# Patient Record
Sex: MALE | HISPANIC OR LATINO | Employment: FULL TIME | URBAN - METROPOLITAN AREA
[De-identification: names, ages, dates, MRNs, and addresses within clinical notes are randomized per-mention and may not be internally consistent; named-entity substitution may affect disease eponyms.]

---

## 2017-10-23 LAB — HCV AB SER-ACNC: NEGATIVE

## 2021-04-22 ENCOUNTER — OFFICE VISIT (OUTPATIENT)
Dept: FAMILY MEDICINE CLINIC | Facility: CLINIC | Age: 31
End: 2021-04-22
Payer: COMMERCIAL

## 2021-04-22 VITALS
BODY MASS INDEX: 25.4 KG/M2 | DIASTOLIC BLOOD PRESSURE: 78 MMHG | TEMPERATURE: 97.2 F | OXYGEN SATURATION: 99 % | HEART RATE: 99 BPM | HEIGHT: 71 IN | RESPIRATION RATE: 16 BRPM | SYSTOLIC BLOOD PRESSURE: 118 MMHG | WEIGHT: 181.4 LBS

## 2021-04-22 DIAGNOSIS — M79.5 FOREIGN BODY (FB) IN SOFT TISSUE: Primary | ICD-10-CM

## 2021-04-22 DIAGNOSIS — Z78.9 BODY PIERCING: ICD-10-CM

## 2021-04-22 PROCEDURE — 99213 OFFICE O/P EST LOW 20 MIN: CPT | Performed by: FAMILY MEDICINE

## 2021-04-22 PROCEDURE — 1036F TOBACCO NON-USER: CPT | Performed by: FAMILY MEDICINE

## 2021-04-22 PROCEDURE — 3725F SCREEN DEPRESSION PERFORMED: CPT | Performed by: FAMILY MEDICINE

## 2021-04-22 PROCEDURE — 3008F BODY MASS INDEX DOCD: CPT | Performed by: FAMILY MEDICINE

## 2021-04-22 NOTE — PROGRESS NOTES
Assessment/Plan:  Patient is 24-year-old male new to our office  He has stab wishing care and would like to discuss removal of a dermal piercing  Piercing is located under the skin on the midshaft of the anterior penis  There is no sign of infection or scar tissue  I told him that this would need to be surgically removed  I will refer him to plastic surgery  We will have him sign a records release  He says that he had blood work and immunizations while in [facility]  Diagnoses and all orders for this visit:    Foreign body (FB) in soft tissue  -     Ambulatory referral to Plastic Surgery; Future    Body piercing          Subjective:   Chief Complaint   Patient presents with   Jennie Lowe Establish Care     NP    Procedure     Piercing Removal Genital      Patient ID: Day Urias is a 36 y o  male  Harrison Alfaroer is a new patient to us  He was incarcerated for 8 years  Working 3rd shift  Works in a warehouse  Tries to work out, but difficult with 3rd shift work  Has a healthy diet  Lives with his girlfriend and her children  Has an 6year old daughter  Has piercing in genital area to be removed  The following portions of the patient's history were reviewed and updated as appropriate: allergies, current medications, past family history, past medical history, past social history, past surgical history and problem list     I reviewed answers  That patient filled out on line regarding personal and medical history  Review of Systems   Constitutional: Negative for chills and fever  HENT: Negative for congestion and sore throat  Respiratory: Negative for chest tightness  Cardiovascular: Negative for chest pain and palpitations  Gastrointestinal: Negative for abdominal pain, constipation, diarrhea and nausea  Genitourinary: Negative for difficulty urinating  Skin: Negative  Neurological: Negative for dizziness and headaches  Psychiatric/Behavioral: Positive for sleep disturbance  Objective:      /78 (BP Location: Left arm, Patient Position: Sitting, Cuff Size: Standard)   Pulse 99   Temp (!) 97 2 °F (36 2 °C) (Tympanic)   Resp 16   Ht 5' 11" (1 803 m)   Wt 82 3 kg (181 lb 6 4 oz)   SpO2 99%   BMI 25 30 kg/m²          Physical Exam  Vitals signs and nursing note reviewed  Constitutional:       General: He is not in acute distress  HENT:      Head: Normocephalic  Right Ear: Tympanic membrane normal       Left Ear: Tympanic membrane normal       Mouth/Throat:      Pharynx: No posterior oropharyngeal erythema  Neck:      Thyroid: No thyromegaly  Cardiovascular:      Rate and Rhythm: Normal rate and regular rhythm  Heart sounds: Normal heart sounds  Pulmonary:      Effort: Pulmonary effort is normal       Breath sounds: Normal breath sounds  Genitourinary:     Comments: Dermal piercing, anterior mid shaft of penis  Freely movable under skin  Lymphadenopathy:      Cervical: No cervical adenopathy  Skin:     General: Skin is warm and dry  Neurological:      Mental Status: He is alert and oriented to person, place, and time     Psychiatric:         Mood and Affect: Mood normal

## 2021-06-09 ENCOUNTER — OFFICE VISIT (OUTPATIENT)
Dept: FAMILY MEDICINE CLINIC | Facility: CLINIC | Age: 31
End: 2021-06-09
Payer: COMMERCIAL

## 2021-06-09 ENCOUNTER — APPOINTMENT (OUTPATIENT)
Dept: LAB | Facility: CLINIC | Age: 31
End: 2021-06-09
Payer: COMMERCIAL

## 2021-06-09 VITALS
SYSTOLIC BLOOD PRESSURE: 116 MMHG | DIASTOLIC BLOOD PRESSURE: 72 MMHG | OXYGEN SATURATION: 98 % | RESPIRATION RATE: 16 BRPM | HEIGHT: 72 IN | WEIGHT: 176.6 LBS | TEMPERATURE: 98 F | BODY MASS INDEX: 23.92 KG/M2 | HEART RATE: 98 BPM

## 2021-06-09 DIAGNOSIS — Z00.00 WELL ADULT EXAM: Primary | ICD-10-CM

## 2021-06-09 DIAGNOSIS — F51.01 PRIMARY INSOMNIA: ICD-10-CM

## 2021-06-09 DIAGNOSIS — R41.840 ATTENTION AND CONCENTRATION DEFICIT: ICD-10-CM

## 2021-06-09 LAB
ALBUMIN SERPL BCP-MCNC: 4.1 G/DL (ref 3.5–5)
ALP SERPL-CCNC: 74 U/L (ref 46–116)
ALT SERPL W P-5'-P-CCNC: 26 U/L (ref 12–78)
ANION GAP SERPL CALCULATED.3IONS-SCNC: 5 MMOL/L (ref 4–13)
AST SERPL W P-5'-P-CCNC: 15 U/L (ref 5–45)
BASOPHILS # BLD AUTO: 0.02 THOUSANDS/ΜL (ref 0–0.1)
BASOPHILS NFR BLD AUTO: 0 % (ref 0–1)
BILIRUB SERPL-MCNC: 0.44 MG/DL (ref 0.2–1)
BUN SERPL-MCNC: 15 MG/DL (ref 5–25)
CALCIUM SERPL-MCNC: 9.4 MG/DL (ref 8.3–10.1)
CHLORIDE SERPL-SCNC: 109 MMOL/L (ref 100–108)
CO2 SERPL-SCNC: 28 MMOL/L (ref 21–32)
CREAT SERPL-MCNC: 1.1 MG/DL (ref 0.6–1.3)
EOSINOPHIL # BLD AUTO: 0.11 THOUSAND/ΜL (ref 0–0.61)
EOSINOPHIL NFR BLD AUTO: 1 % (ref 0–6)
ERYTHROCYTE [DISTWIDTH] IN BLOOD BY AUTOMATED COUNT: 13.6 % (ref 11.6–15.1)
GFR SERPL CREATININE-BSD FRML MDRD: 90 ML/MIN/1.73SQ M
GLUCOSE SERPL-MCNC: 79 MG/DL (ref 65–140)
HCT VFR BLD AUTO: 45.8 % (ref 36.5–49.3)
HGB BLD-MCNC: 16.1 G/DL (ref 12–17)
IMM GRANULOCYTES # BLD AUTO: 0.03 THOUSAND/UL (ref 0–0.2)
IMM GRANULOCYTES NFR BLD AUTO: 0 % (ref 0–2)
LYMPHOCYTES # BLD AUTO: 2.44 THOUSANDS/ΜL (ref 0.6–4.47)
LYMPHOCYTES NFR BLD AUTO: 25 % (ref 14–44)
MCH RBC QN AUTO: 31.9 PG (ref 26.8–34.3)
MCHC RBC AUTO-ENTMCNC: 35.2 G/DL (ref 31.4–37.4)
MCV RBC AUTO: 91 FL (ref 82–98)
MONOCYTES # BLD AUTO: 0.66 THOUSAND/ΜL (ref 0.17–1.22)
MONOCYTES NFR BLD AUTO: 7 % (ref 4–12)
NEUTROPHILS # BLD AUTO: 6.38 THOUSANDS/ΜL (ref 1.85–7.62)
NEUTS SEG NFR BLD AUTO: 67 % (ref 43–75)
NRBC BLD AUTO-RTO: 0 /100 WBCS
PLATELET # BLD AUTO: 247 THOUSANDS/UL (ref 149–390)
PMV BLD AUTO: 10.5 FL (ref 8.9–12.7)
POTASSIUM SERPL-SCNC: 4 MMOL/L (ref 3.5–5.3)
PROT SERPL-MCNC: 7.8 G/DL (ref 6.4–8.2)
RBC # BLD AUTO: 5.04 MILLION/UL (ref 3.88–5.62)
SODIUM SERPL-SCNC: 142 MMOL/L (ref 136–145)
TSH SERPL DL<=0.05 MIU/L-ACNC: 1.25 UIU/ML (ref 0.36–3.74)
WBC # BLD AUTO: 9.64 THOUSAND/UL (ref 4.31–10.16)

## 2021-06-09 PROCEDURE — 99395 PREV VISIT EST AGE 18-39: CPT | Performed by: FAMILY MEDICINE

## 2021-06-09 PROCEDURE — 1036F TOBACCO NON-USER: CPT | Performed by: FAMILY MEDICINE

## 2021-06-09 PROCEDURE — 36415 COLL VENOUS BLD VENIPUNCTURE: CPT

## 2021-06-09 PROCEDURE — 85025 COMPLETE CBC W/AUTO DIFF WBC: CPT

## 2021-06-09 PROCEDURE — 3008F BODY MASS INDEX DOCD: CPT | Performed by: FAMILY MEDICINE

## 2021-06-09 PROCEDURE — 80053 COMPREHEN METABOLIC PANEL: CPT

## 2021-06-09 PROCEDURE — 84443 ASSAY THYROID STIM HORMONE: CPT

## 2021-06-09 RX ORDER — TRAZODONE HYDROCHLORIDE 50 MG/1
50 TABLET ORAL
Qty: 15 TABLET | Refills: 0 | Status: SHIPPED | OUTPATIENT
Start: 2021-06-09 | End: 2021-07-12 | Stop reason: SDUPTHER

## 2021-06-09 NOTE — PROGRESS NOTES
Assessment/Plan:  Patient is seen for well exam    I reveiwed old records regarding immunizations and blood work he had  He has problems with sleep and also feeling anxious - can't focus because he feels that he has to be watching everything  He recently was released from nursing home and has difficulty leaving go of the vigilance he felt he needed while being incarcerated  I recommended trying a low dose of Trazadone for sleep - I wrote for two weeks worth and am checking thyroid, blood count and chemistry  I am also recommending that he consider therapy  Diagnoses and all orders for this visit:    Well adult exam    Primary insomnia  -     TSH, 3rd generation with Free T4 reflex; Future  -     CBC and differential; Future  -     Cancel: Comprehensive metabolic panel; Future  -     traZODone (DESYREL) 50 mg tablet; Take 1 tablet (50 mg total) by mouth daily at bedtime  -     Comprehensive metabolic panel; Future    Attention and concentration deficit  -     TSH, 3rd generation with Free T4 reflex; Future  -     CBC and differential; Future  -     Cancel: Comprehensive metabolic panel; Future  -     Comprehensive metabolic panel; Future          Subjective:   Chief Complaint   Patient presents with    Physical Exam     Annual    Insomnia     Falling & Staying    Anxiety        Patient ID: Arabella Rodriguez is a 36 y o  male  Patient is scheduled for a well exam   He is having trouble sleeping  Trouble staying asleep and sometimes falling asleep  Having problems staying focused  Problems staying focused on the task  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"  Is working [employer] - physical work  Family members are pointing it out to him  Has tried Melatonin    Anxiety  Symptoms include nervous/anxious behavior  Patient reports no chest pain, dizziness, nausea or palpitations             The following portions of the patient's history were reviewed and updated as appropriate: allergies, current medications, past family history, past medical history, past social history, past surgical history and problem list     Review of Systems   Constitutional: Negative for chills and fever  HENT: Negative for congestion and sore throat  Respiratory: Negative for chest tightness  Cardiovascular: Negative for chest pain and palpitations  Gastrointestinal: Negative for abdominal pain, constipation, diarrhea and nausea  Genitourinary: Negative for difficulty urinating  Skin: Negative  Neurological: Negative for dizziness and headaches  Psychiatric/Behavioral: The patient is nervous/anxious  Objective:      /72 (BP Location: Left arm, Patient Position: Sitting, Cuff Size: Standard)   Pulse 98   Temp 98 °F (36 7 °C) (Tympanic)   Resp 16   Ht 5' 11 65" (1 82 m)   Wt 80 1 kg (176 lb 9 6 oz)   SpO2 98%   BMI 24 18 kg/m²          Physical Exam  Vitals signs and nursing note reviewed  Constitutional:       General: He is not in acute distress  HENT:      Head: Normocephalic  Right Ear: Tympanic membrane normal       Left Ear: Tympanic membrane normal    Neck:      Thyroid: No thyromegaly  Cardiovascular:      Rate and Rhythm: Normal rate and regular rhythm  Heart sounds: Normal heart sounds  Pulmonary:      Effort: Pulmonary effort is normal       Breath sounds: Normal breath sounds  Musculoskeletal:      Right lower leg: No edema  Left lower leg: No edema  Lymphadenopathy:      Cervical: No cervical adenopathy  Skin:     General: Skin is warm and dry  Comments: multiple tattoos over entire body   Neurological:      Mental Status: He is alert and oriented to person, place, and time     Psychiatric:         Mood and Affect: Mood normal

## 2021-07-19 ENCOUNTER — OFFICE VISIT (OUTPATIENT)
Dept: FAMILY MEDICINE CLINIC | Facility: CLINIC | Age: 31
End: 2021-07-19
Payer: COMMERCIAL

## 2021-07-19 VITALS
TEMPERATURE: 97.1 F | WEIGHT: 176.4 LBS | DIASTOLIC BLOOD PRESSURE: 86 MMHG | HEART RATE: 113 BPM | RESPIRATION RATE: 16 BRPM | SYSTOLIC BLOOD PRESSURE: 126 MMHG | BODY MASS INDEX: 23.89 KG/M2 | OXYGEN SATURATION: 98 % | HEIGHT: 72 IN

## 2021-07-19 DIAGNOSIS — F41.9 ANXIETY: Primary | ICD-10-CM

## 2021-07-19 DIAGNOSIS — F51.01 PRIMARY INSOMNIA: ICD-10-CM

## 2021-07-19 PROCEDURE — 3008F BODY MASS INDEX DOCD: CPT | Performed by: FAMILY MEDICINE

## 2021-07-19 PROCEDURE — 99213 OFFICE O/P EST LOW 20 MIN: CPT | Performed by: FAMILY MEDICINE

## 2021-07-19 PROCEDURE — 1036F TOBACCO NON-USER: CPT | Performed by: FAMILY MEDICINE

## 2021-07-19 RX ORDER — QUETIAPINE FUMARATE 50 MG/1
50 TABLET, FILM COATED ORAL
Qty: 30 TABLET | Refills: 1 | Status: SHIPPED | OUTPATIENT
Start: 2021-07-19 | End: 2021-11-02 | Stop reason: ALTCHOICE

## 2021-07-19 NOTE — PROGRESS NOTES
Assessment/Plan:    Patient is 72-year-old male with anxiety and insomnia  Trazodone made him feel groggy during the day  We will try a low dose of sertraline which she can take upon awakening and a low-dose of Seroquel at bedtime  I did give him some phone numbers for counseling  I will see him back in 4 weeks  Diagnoses and all orders for this visit:    Anxiety  -     sertraline (ZOLOFT) 50 mg tablet; Take 1 tablet (50 mg total) by mouth daily  -     QUEtiapine (SEROquel) 50 mg tablet; Take 1 tablet (50 mg total) by mouth daily at bedtime    Primary insomnia  -     QUEtiapine (SEROquel) 50 mg tablet; Take 1 tablet (50 mg total) by mouth daily at bedtime          Subjective:   Chief Complaint   Patient presents with    Follow-up     discuss Trazadone  States when he takes it does not help with sleeping but makes him feel groggy         Patient ID: Michel Mansfield is a 36 y o  male  Patient is here to follow up on anxiety and problems with sleep  Still not sleeping  Is still working night shift  He feels the trazodone makes him groggy during the day  He is still feeling anxious during the day  The following portions of the patient's history were reviewed and updated as appropriate: allergies, current medications, past family history, past medical history, past social history, past surgical history and problem list     Review of Systems   Psychiatric/Behavioral: Positive for sleep disturbance  Negative for self-injury and suicidal ideas  The patient is nervous/anxious  Objective:      /86 (BP Location: Right arm, Patient Position: Sitting, Cuff Size: Adult)   Pulse (!) 113   Temp (!) 97 1 °F (36 2 °C) (Temporal)   Resp 16   Ht 5' 11 5" (1 816 m)   Wt 80 kg (176 lb 6 4 oz)   SpO2 98%   BMI 24 26 kg/m²          Physical Exam  Vitals and nursing note reviewed  Constitutional:       General: He is not in acute distress  Neck:      Thyroid: No thyromegaly  Cardiovascular:      Rate and Rhythm: Normal rate and regular rhythm  Heart sounds: Normal heart sounds  Pulmonary:      Effort: Pulmonary effort is normal       Breath sounds: Normal breath sounds  Skin:     General: Skin is warm and dry  Neurological:      Mental Status: He is alert and oriented to person, place, and time  Psychiatric:         Mood and Affect: Mood is anxious

## 2021-08-19 ENCOUNTER — TELEPHONE (OUTPATIENT)
Dept: FAMILY MEDICINE CLINIC | Facility: CLINIC | Age: 31
End: 2021-08-19

## 2021-08-19 NOTE — TELEPHONE ENCOUNTER
I tried to call patient for his virtual visit  He was scheduled for 2:30 but was held up on a phone call with another patient and did not get to call him until  2:40  He did not answer  I am told the receptionists attempted to call him and he had no voicemail set up and did not answer

## 2021-10-13 ENCOUNTER — OFFICE VISIT (OUTPATIENT)
Dept: FAMILY MEDICINE CLINIC | Facility: CLINIC | Age: 31
End: 2021-10-13
Payer: COMMERCIAL

## 2021-10-13 VITALS
HEART RATE: 116 BPM | TEMPERATURE: 97.4 F | OXYGEN SATURATION: 98 % | WEIGHT: 177 LBS | HEIGHT: 71 IN | SYSTOLIC BLOOD PRESSURE: 130 MMHG | BODY MASS INDEX: 24.78 KG/M2 | DIASTOLIC BLOOD PRESSURE: 90 MMHG

## 2021-10-13 DIAGNOSIS — F51.01 PRIMARY INSOMNIA: ICD-10-CM

## 2021-10-13 DIAGNOSIS — F41.9 ANXIETY: Primary | ICD-10-CM

## 2021-10-13 PROBLEM — G47.9 FATIGUE DUE TO SLEEP PATTERN DISTURBANCE: Status: ACTIVE | Noted: 2021-08-22

## 2021-10-13 PROBLEM — R53.83 FATIGUE DUE TO SLEEP PATTERN DISTURBANCE: Status: ACTIVE | Noted: 2021-08-22

## 2021-10-13 PROCEDURE — 99214 OFFICE O/P EST MOD 30 MIN: CPT | Performed by: FAMILY MEDICINE

## 2021-10-13 RX ORDER — MIRTAZAPINE 7.5 MG/1
7.5 TABLET, FILM COATED ORAL
Qty: 30 TABLET | Refills: 1 | Status: SHIPPED | OUTPATIENT
Start: 2021-10-13 | End: 2022-01-27 | Stop reason: ALTCHOICE

## 2021-10-14 ENCOUNTER — OFFICE VISIT (OUTPATIENT)
Dept: URGENT CARE | Facility: MEDICAL CENTER | Age: 31
End: 2021-10-14
Payer: COMMERCIAL

## 2021-10-14 VITALS
HEIGHT: 71 IN | BODY MASS INDEX: 24.78 KG/M2 | TEMPERATURE: 97 F | OXYGEN SATURATION: 98 % | RESPIRATION RATE: 20 BRPM | HEART RATE: 65 BPM | DIASTOLIC BLOOD PRESSURE: 74 MMHG | WEIGHT: 177 LBS | SYSTOLIC BLOOD PRESSURE: 124 MMHG

## 2021-10-14 DIAGNOSIS — H10.33 ACUTE CONJUNCTIVITIS OF BOTH EYES, UNSPECIFIED ACUTE CONJUNCTIVITIS TYPE: Primary | ICD-10-CM

## 2021-10-14 PROCEDURE — 99213 OFFICE O/P EST LOW 20 MIN: CPT | Performed by: PHYSICIAN ASSISTANT

## 2021-10-14 RX ORDER — GENTAMICIN SULFATE 3 MG/ML
1 SOLUTION/ DROPS OPHTHALMIC 4 TIMES DAILY
Qty: 1.4 ML | Refills: 0 | Status: SHIPPED | OUTPATIENT
Start: 2021-10-14 | End: 2021-10-21

## 2022-01-07 DIAGNOSIS — Z11.59 SCREENING FOR VIRAL DISEASE: Primary | ICD-10-CM

## 2022-01-07 PROCEDURE — U0003 INFECTIOUS AGENT DETECTION BY NUCLEIC ACID (DNA OR RNA); SEVERE ACUTE RESPIRATORY SYNDROME CORONAVIRUS 2 (SARS-COV-2) (CORONAVIRUS DISEASE [COVID-19]), AMPLIFIED PROBE TECHNIQUE, MAKING USE OF HIGH THROUGHPUT TECHNOLOGIES AS DESCRIBED BY CMS-2020-01-R: HCPCS | Performed by: FAMILY MEDICINE

## 2022-01-07 PROCEDURE — U0005 INFEC AGEN DETEC AMPLI PROBE: HCPCS | Performed by: FAMILY MEDICINE

## 2022-01-20 ENCOUNTER — RA CDI HCC (OUTPATIENT)
Dept: OTHER | Facility: HOSPITAL | Age: 32
End: 2022-01-20

## 2022-01-20 NOTE — PROGRESS NOTES
[facility]       Chart reviewed, no opportunity found: CHART REVIEWED, NO OPPORTUNITY FOUND                        Patients insurance company:  [employer] [facility] (Medicare Advantage and Commercial)

## 2022-01-27 ENCOUNTER — OFFICE VISIT (OUTPATIENT)
Dept: FAMILY MEDICINE CLINIC | Facility: CLINIC | Age: 32
End: 2022-01-27
Payer: COMMERCIAL

## 2022-01-27 VITALS
RESPIRATION RATE: 18 BRPM | WEIGHT: 184.2 LBS | SYSTOLIC BLOOD PRESSURE: 128 MMHG | DIASTOLIC BLOOD PRESSURE: 88 MMHG | BODY MASS INDEX: 25.79 KG/M2 | HEIGHT: 71 IN | HEART RATE: 93 BPM | OXYGEN SATURATION: 98 % | TEMPERATURE: 98.3 F

## 2022-01-27 DIAGNOSIS — F41.9 ANXIETY: Primary | ICD-10-CM

## 2022-01-27 DIAGNOSIS — G47.00 INSOMNIA, UNSPECIFIED TYPE: ICD-10-CM

## 2022-01-27 PROCEDURE — 99214 OFFICE O/P EST MOD 30 MIN: CPT | Performed by: FAMILY MEDICINE

## 2022-01-27 RX ORDER — BUSPIRONE HYDROCHLORIDE 5 MG/1
5 TABLET ORAL 2 TIMES DAILY
Qty: 60 TABLET | Refills: 1 | Status: SHIPPED | OUTPATIENT
Start: 2022-01-27

## 2022-01-27 NOTE — PROGRESS NOTES
Assessment/Plan:    Patient is seen for follow-up to anxiety and problems sleeping  When I saw him last, he is working night shift and was having difficulty with sleep during the day  I had tried trazodone and then mirtazapine  These did not help him he  He has been seen a psychologist   He tells me that the psychologist recommended Sarabjit [address]  I did discuss that any stimulants prescribed for ADHD, can be addictive  I am hesitant to write for them in adults  I will try BuSpar for anxiety  I will see him back in a month  I recommended that he see Psychiatry to determine what medications would best treat his issues and make sure we are working with the correct diagnosis  Diagnoses and all orders for this visit:    Anxiety  -     busPIRone (BUSPAR) 5 mg tablet; Take 1 tablet (5 mg total) by mouth 2 (two) times a day  -     Ambulatory Referral to Psychiatry; Future    Insomnia, unspecified type  -     Ambulatory Referral to Psychiatry; Future          Subjective:   Chief Complaint   Patient presents with    Anxiety        Patient ID: Oneida Sandoval is a 36 y o  male  Patient recently has switched from night shift to day shift  Is getting to see his daughter more  He is seeing Kait Menjivar - a psychologist in [place]  Anxiety  Symptoms include nervous/anxious behavior  Patient reports no chest pain, dizziness, nausea or palpitations  The following portions of the patient's history were reviewed and updated as appropriate: allergies, current medications, past family history, past medical history, past social history, past surgical history and problem list     Review of Systems   Constitutional: Negative for chills and fever  HENT: Negative for congestion and sore throat  Respiratory: Negative for chest tightness  Cardiovascular: Negative for chest pain and palpitations  Gastrointestinal: Negative for abdominal pain, constipation, diarrhea and nausea     Genitourinary: Negative for difficulty urinating  Skin: Negative  Neurological: Negative for dizziness and headaches  Psychiatric/Behavioral: Positive for sleep disturbance  The patient is nervous/anxious  Objective:      /88 (BP Location: Left arm, Patient Position: Sitting)   Pulse 93   Temp 98 3 °F (36 8 °C) (Tympanic)   Resp 18   Ht 5' 11" (1 803 m)   Wt 83 6 kg (184 lb 3 2 oz)   SpO2 98%   BMI 25 69 kg/m²          Physical Exam  Vitals and nursing note reviewed  Constitutional:       General: He is not in acute distress  HENT:      Head: Normocephalic  Cardiovascular:      Rate and Rhythm: Normal rate and regular rhythm  Pulmonary:      Effort: Pulmonary effort is normal  No respiratory distress  Skin:     General: Skin is warm and dry  Neurological:      Mental Status: He is alert and oriented to person, place, and time  Psychiatric:         Attention and Perception: Attention normal          Mood and Affect: Mood is anxious  Behavior: Behavior is hyperactive  Comments: Patient talking a lot  Moves around a lot

## 2022-01-28 ENCOUNTER — TELEPHONE (OUTPATIENT)
Dept: ADMINISTRATIVE | Facility: OTHER | Age: 32
End: 2022-01-28

## 2022-01-28 NOTE — TELEPHONE ENCOUNTER
Upon review of the In Basket request we were able to locate, review, and update the patient chart as requested for Hepatitis C   Any additional questions or concerns should be emailed to the Practice Liaisons via [email or web address] email, please do not reply via In Basket      Thank you  Mauro Miner MA

## 2022-01-28 NOTE — TELEPHONE ENCOUNTER
----- Message from Luis Eduardo Shea sent at 1/27/2022  6:44 PM EST -----  Regarding: Care Gap Request  01/27/22 6:44 PM    Hello, our patient Mario Mejia has had Hepatitis C completed/performed  Please assist in updating the patient chart by pulling the document from the Media Tab  The date of service is 10/25/2017  Documentation scanned as previous records on 5/1/2021, see page 14 of 19       Thank you,  Robert Tesfaye MA  [facility]

## 2022-03-22 ENCOUNTER — TELEPHONE (OUTPATIENT)
Dept: PSYCHIATRY | Facility: CLINIC | Age: 32
End: 2022-03-22

## 2022-10-20 ENCOUNTER — TELEPHONE (OUTPATIENT)
Dept: PSYCHIATRY | Facility: CLINIC | Age: 32
End: 2022-10-20

## 2022-10-20 NOTE — TELEPHONE ENCOUNTER
Contacted patientusing interpretive services Emmy Doll, [phone]) off med mgmt wait list to verify needs of services in attempts too update list  Unable to lvm for patient to contact intake dept due to vm not being set up

## 2023-07-24 ENCOUNTER — HOSPITAL ENCOUNTER (EMERGENCY)
Facility: HOSPITAL | Age: 33
Discharge: HOME | End: 2023-07-24
Attending: EMERGENCY MEDICINE | Admitting: EMERGENCY MEDICINE
Payer: MEDICARE

## 2023-07-24 VITALS
HEART RATE: 57 BPM | RESPIRATION RATE: 18 BRPM | SYSTOLIC BLOOD PRESSURE: 137 MMHG | OXYGEN SATURATION: 99 % | DIASTOLIC BLOOD PRESSURE: 61 MMHG | TEMPERATURE: 97.5 F

## 2023-07-24 DIAGNOSIS — T67.5XXA HEAT EXHAUSTION, INITIAL ENCOUNTER: Primary | ICD-10-CM

## 2023-07-24 DIAGNOSIS — R53.1 GENERALIZED WEAKNESS: ICD-10-CM

## 2023-07-24 DIAGNOSIS — E86.0 DEHYDRATION: ICD-10-CM

## 2023-07-24 PROCEDURE — 93005 ELECTROCARDIOGRAM TRACING: CPT

## 2023-07-24 PROCEDURE — 99285 EMERGENCY DEPT VISIT HI MDM: CPT

## 2023-07-24 RX ORDER — ONDANSETRON 4 MG/1
4 TABLET, FILM COATED ORAL EVERY 6 HOURS
Qty: 12 TABLET | Refills: 0 | Status: SHIPPED | OUTPATIENT
Start: 2023-07-24

## 2023-07-24 RX ORDER — ONDANSETRON 4 MG/1
4 TABLET, ORALLY DISINTEGRATING ORAL ONCE
Status: COMPLETED | OUTPATIENT
Start: 2023-07-24 | End: 2023-07-24

## 2023-07-24 RX ADMIN — ONDANSETRON 4 MG: 4 TABLET, ORALLY DISINTEGRATING ORAL at 17:21

## 2023-07-24 NOTE — ED NOTES
Pt was given apple juice and water, pt proceeded to chug the apple juice with no signs of nausea or vomiting.       Fely Boyd RN  07/24/23 [id number]

## 2023-07-24 NOTE — ED ATTENDING ATTESTATION
7/24/2023  I, Ulysses Ceballos MD, saw and evaluated the patient. I have discussed the patient with the resident/non-physician practitioner and agree with the resident's/non-physician practitioner's findings, Plan of Care, and MDM as documented in the resident's/non-physician practitioner's note, except where noted. All available labs and Radiology studies were reviewed. I was present for key portions of any procedure(s) performed by the resident/non-physician practitioner and I was immediately available to provide assistance. At this point I agree with the current assessment done in the Emergency Department. I have conducted an independent evaluation of this patient a history and physical is as follows:    ED Course     68-year-old male, previously healthy, presenting to the emergency department for evaluation of lightheadedness and nausea which has now resolved. 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 Patient began to feel lightheaded, was nauseous, and attempted to orally rehydrate himself, however subsequently developed vomiting. Patient initially went to [facility] and was in the waiting room for several hours. During that period of time he states lightheadedness has improved, and the nausea has improved, and he has been able to drink 1-1/2 32 ounce Gatorade bottles. Patient states that he is currently asymptomatic. The patient is resting comfortably on a stretcher in no acute respiratory distress. The patient appears nontoxic. HEENT reveals moist mucous membranes. Head is normocephalic and atraumatic. Conjunctiva and sclera are normal. Neck is nontender and supple with full range of motion to flexion, extension, lateral rotation. No meningismus appreciated. No masses are appreciated. Lungs are clear to auscultation bilaterally without any wheezes, rales or rhonchi.  Heart is regular rate and rhythm without any murmurs, rubs or gallops. Abdomen is soft and nontender without any rebound or guarding. Extremities appear grossly normal without any significant arthropathy. On skin examination the patient has a number of papules in various stages of healing that look like they have been excoriated. Patient is awake, alert, and oriented x3. The patient has normal interaction. Cranial nerves grossly intact. Motor is 5 out of 5 bilateral upper and lower extremities. Heat exhaustion. EKG will be performed to evaluate for Brugada syndrome, prolonged QT, WPW, or other EKG abnormalities that suggest electrolyte dysfunction. My EKG interpretation: Sinus bradycardia with significant sinus arrhythmia at 52 bpm.  Diffuse ST elevation associated with benign early repolarization pattern. Patient was given Zofran in the emergency department for nausea, continued to tolerate oral fluids, and was subsequently discharged.     Critical Care Time  Procedures

## 2023-07-24 NOTE — ED PROVIDER NOTES
History  Chief Complaint   Patient presents with   • Weakness - Generalized     Pt reports working outside, sweating, couldn't hold down water and vomiting Pt reports feeling weak. 35 y/o M presents with symptoms of heat exhaustion. 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 Around 1pm he began to feel dizzy and nauseous. He attempted to drink water but began to vomit. He states he also developed a slight rash of his left face as well as some scattered spots along his lower legs and forearms as well. He went [place] [facility] [place] waited for three hours and was vomiting so he came here before he could be treated. He denies fever, chills, chest pain, SOB, trouble breathing, or syncope/ near syncope. Prior to Admission Medications   Prescriptions Last Dose Informant Patient Reported? Taking?   busPIRone (BUSPAR) 5 mg tablet   No No   Sig: Take 1 tablet (5 mg total) by mouth 2 (two) times a day      Facility-Administered Medications: None       History reviewed. No pertinent past medical history. History reviewed. No pertinent surgical history. History reviewed. No pertinent family history. I have reviewed and agree with the history as documented. E-Cigarette/Vaping   • E-Cigarette Use Never User      E-Cigarette/Vaping Substances   • Nicotine No    • THC No    • CBD No    • Flavoring No    • Other No    • Unknown No      Social History     Tobacco Use   • Smoking status: Never   • Smokeless tobacco: Never   Vaping Use   • Vaping Use: Never used   Substance Use Topics   • Alcohol use: Never   • Drug use: Never        Review of Systems   Constitutional: Positive for fatigue. Negative for activity change, chills, diaphoresis and fever. HENT: Negative for trouble swallowing and voice change. Respiratory: Negative for chest tightness, shortness of breath, wheezing and stridor. Cardiovascular: Negative for chest pain, palpitations and leg swelling. Gastrointestinal: Negative for abdominal pain. Musculoskeletal: Negative for arthralgias and myalgias. Neurological: Negative for syncope, facial asymmetry, speech difficulty, weakness and headaches. Psychiatric/Behavioral: Negative for agitation and confusion. Physical Exam  ED Triage Vitals [07/24/23 1539]   Temperature Pulse Respirations Blood Pressure SpO2   97.5 °F (36.4 °C) 57 18 137/61 99 %      Temp Source Heart Rate Source Patient Position - Orthostatic VS BP Location FiO2 (%)   Temporal Monitor Sitting Left arm --      Pain Score       --             Orthostatic Vital Signs  Vitals:    07/24/23 1539   BP: 137/61   Pulse: 57   Patient Position - Orthostatic VS: Sitting       Physical Exam  Constitutional:       General: He is not in acute distress. Appearance: Normal appearance. He is not toxic-appearing or diaphoretic. HENT:      Head: Normocephalic and atraumatic. Mouth/Throat:      Mouth: Mucous membranes are moist.      Pharynx: Oropharynx is clear. Eyes:      Extraocular Movements: Extraocular movements intact. Pupils: Pupils are equal, round, and reactive to light. Cardiovascular:      Rate and Rhythm: Normal rate and regular rhythm. Pulses: Normal pulses. Heart sounds: Normal heart sounds. No murmur heard. No friction rub. No gallop. Pulmonary:      Effort: Pulmonary effort is normal. No respiratory distress. Breath sounds: Normal breath sounds. No stridor. No wheezing. Abdominal:      General: Abdomen is flat. Bowel sounds are normal. There is no distension. Palpations: Abdomen is soft. Tenderness: There is no abdominal tenderness. Musculoskeletal:      Cervical back: Normal range of motion. No rigidity. Skin:     General: Skin is warm and dry. Findings: Rash present. Comments: Scattered urticarial rash present on face and exposed portions of forearms and legs. No pustules, oozing, bleeding or DC form the rash. Neurological:      General: No focal deficit present. Mental Status: He is alert and oriented to person, place, and time. Cranial Nerves: No cranial nerve deficit. Sensory: No sensory deficit. Motor: No weakness. Coordination: Coordination normal.      Gait: Gait normal.   Psychiatric:         Mood and Affect: Mood normal.         Behavior: Behavior normal.         Thought Content: Thought content normal.         ED Medications  Medications   ondansetron (ZOFRAN-ODT) dispersible tablet 4 mg (4 mg Oral Given 7/24/23 1721)       Diagnostic Studies  Results Reviewed     None                 No orders to display         Procedures  Procedures      ED Course                                       Medical Decision Making  33 y/o M presents with signs and symptoms of acute dehydration / heat exhaustion after working outside all day. While presentation is not necessarily consistent with anaphylaxis, he did has small areas of urticarial rash. Although suspected to be due to plant allergy from work, anaphylaxis was still clinically ruled out. Patient began to tolerate PO fluid upon arrival and was given zofran and  orally rehydrated successfully. He was monitored for 2 hours and was stable throughout his stay. Reassessment was reassuring and patient was deemed stable for DC to home. Clear instructions for future avoidance of this condition as well as emergency room return instructions reviewed with patient who verbalized understanding. Risk  Prescription drug management.             Disposition  Final diagnoses:   Generalized weakness   Dehydration   Heat exhaustion, initial encounter     Time reflects when diagnosis was documented in both MDM as applicable and the Disposition within this note     Time User Action Codes Description Comment    7/24/2023  6:03 PM Solis Heard Add [R53.1] Generalized weakness     7/24/2023  6:03 PM Solis Heard Add [E86.0] Dehydration     7/24/2023  6:03 PM Walker Malissa Tang Add Alexsandra Kwan. 5XXA] Heat exhaustion, initial encounter     7/24/2023  6:03 PM Joaquín Jaimes Modify [R53.1] Generalized weakness     7/24/2023  6:03 PM Joaquín Valleer Modify Alexsandra Kwan. 5XXA] Heat exhaustion, initial encounter       ED Disposition     ED Disposition   Discharge    Condition   Stable    Date/Time   Mon Jul 24, 2023  6:03 PM    Comment   Simone Garza discharge to home/self care. Follow-up Information     Follow up With Specialties Details Why Contact Info Additional [address] [facility] Emergency Medicine Go to  If symptoms worsen [address] [facility] Emergency Department, [address] [place] [address]          Discharge Medication List as of 7/24/2023  6:04 PM      START taking these medications    Details   ondansetron (ZOFRAN) 4 mg tablet Take 1 tablet (4 mg total) by mouth every 6 (six) hours, Starting Mon 7/24/2023, Normal         CONTINUE these medications which have NOT CHANGED    Details   busPIRone (BUSPAR) 5 mg tablet Take 1 tablet (5 mg total) by mouth 2 (two) times a day, Starting Thu 1/27/2022, Normal           No discharge procedures on file. PDMP Review     None           ED Provider  Attending physically available and evaluated Simone Garza. I managed the patient along with the ED Attending.     Electronically Signed by         Joaquín Jaimes MD  07/24/23 3605

## 2023-07-25 ENCOUNTER — VBI (OUTPATIENT)
Dept: ADMINISTRATIVE | Facility: OTHER | Age: 33
End: 2023-07-25

## 2023-07-25 LAB
ATRIAL RATE: 138 BPM
P AXIS: 68 DEGREES
QRS AXIS: 84 DEGREES
QRSD INTERVAL: 80 MS
QT INTERVAL: 412 MS
QTC INTERVAL: 383 MS
T WAVE AXIS: 72 DEGREES
VENTRICULAR RATE: 52 BPM

## 2023-07-25 PROCEDURE — 93010 ELECTROCARDIOGRAM REPORT: CPT | Performed by: INTERNAL MEDICINE

## 2023-07-25 NOTE — TELEPHONE ENCOUNTER
Odessa Schlatter    ED Visit Information     Ed visit date: 7/24/23 2 visits 1st [facility] and second IN  Diagnosis Description: weakness  In Network? Yes [facility]  Discharge status: Home  Discharged with meds ? Yes  Number of ED visits to date: 2  ED Severity:3     Outreach Information    Outreach successful:Yes 2  Date letter mailed:no  Date Finalized 7/25/23    Care Coordination    Follow up appointment with pcp: no will contact if needed  Transportation issues ?  No    [employer] type: 3 Day Outreach  Emergent necessity warranted by diagnosis: Yes  [facility] PCP: Yes  Transportation: Self Transport  Called PCP first?: No  Feels able to call PCP for urgent problems ?: Yes  Understands what emergencies can be handled by PCP ?: Yes  Practice Contacted Patient ?: No  Pt had ED follow up with pcp/staff ?: No    Reason Pt went out of  network ?: proximty  Reason Patient went to ED instead of Urgent Care or PCP?: Perceived Severity of Illness  Left Message - ask patient if he needs a F/U appt - and ED questions  spoke with patient concerned about EKG - did not want me to put message back to clinical staff - advised to go onto mychart and see reuslts if concerned contact PCP office of send a messae to staff through

## 2023-07-31 ENCOUNTER — TELEPHONE (OUTPATIENT)
Dept: FAMILY MEDICINE CLINIC | Facility: CLINIC | Age: 33
End: 2023-07-31

## 2023-07-31 NOTE — TELEPHONE ENCOUNTER
Left message for patient in regards to recent ER visit last week at [facility]. Requesting to know how he is feeling and asking if he would like to schedule an appointment. Last seen 1/27/22.

## 2023-11-29 ENCOUNTER — TELEPHONE (OUTPATIENT)
Dept: FAMILY MEDICINE CLINIC | Facility: CLINIC | Age: 33
End: 2023-11-29